# Patient Record
Sex: MALE | Race: WHITE | NOT HISPANIC OR LATINO | Employment: UNEMPLOYED | URBAN - METROPOLITAN AREA
[De-identification: names, ages, dates, MRNs, and addresses within clinical notes are randomized per-mention and may not be internally consistent; named-entity substitution may affect disease eponyms.]

---

## 2020-02-06 ENCOUNTER — HOSPITAL ENCOUNTER (EMERGENCY)
Facility: OTHER | Age: 40
Discharge: HOME | End: 2020-02-07
Attending: EMERGENCY MEDICINE

## 2020-02-06 DIAGNOSIS — J40 BRONCHITIS: Primary | ICD-10-CM

## 2020-02-06 DIAGNOSIS — R04.2 HEMOPTYSIS: ICD-10-CM

## 2020-02-06 PROCEDURE — 99285 EMERGENCY DEPT VISIT HI MDM: CPT | Mod: 25

## 2020-02-07 VITALS
BODY MASS INDEX: 32.08 KG/M2 | SYSTOLIC BLOOD PRESSURE: 116 MMHG | OXYGEN SATURATION: 98 % | RESPIRATION RATE: 16 BRPM | DIASTOLIC BLOOD PRESSURE: 77 MMHG | HEART RATE: 62 BPM | TEMPERATURE: 99 F | WEIGHT: 250 LBS | HEIGHT: 74 IN

## 2020-02-07 LAB
ALBUMIN SERPL BCP-MCNC: 4.2 G/DL (ref 3.5–5.2)
ALP SERPL-CCNC: 68 U/L (ref 55–135)
ALT SERPL W/O P-5'-P-CCNC: 47 U/L (ref 10–44)
ANION GAP SERPL CALC-SCNC: 10 MMOL/L (ref 8–16)
AST SERPL-CCNC: 33 U/L (ref 10–40)
BACTERIA #/AREA URNS HPF: NORMAL /HPF
BASOPHILS # BLD AUTO: 0.03 K/UL (ref 0–0.2)
BASOPHILS NFR BLD: 0.5 % (ref 0–1.9)
BILIRUB SERPL-MCNC: 0.6 MG/DL (ref 0.1–1)
BILIRUB UR QL STRIP: NEGATIVE
BUN SERPL-MCNC: 9 MG/DL (ref 6–20)
CALCIUM SERPL-MCNC: 9.4 MG/DL (ref 8.7–10.5)
CHLORIDE SERPL-SCNC: 104 MMOL/L (ref 95–110)
CLARITY UR: CLEAR
CO2 SERPL-SCNC: 27 MMOL/L (ref 23–29)
COLOR UR: ABNORMAL
CREAT SERPL-MCNC: 0.8 MG/DL (ref 0.5–1.4)
DIFFERENTIAL METHOD: ABNORMAL
EOSINOPHIL # BLD AUTO: 0.2 K/UL (ref 0–0.5)
EOSINOPHIL NFR BLD: 2.8 % (ref 0–8)
ERYTHROCYTE [DISTWIDTH] IN BLOOD BY AUTOMATED COUNT: 13 % (ref 11.5–14.5)
EST. GFR  (AFRICAN AMERICAN): >60 ML/MIN/1.73 M^2
EST. GFR  (NON AFRICAN AMERICAN): >60 ML/MIN/1.73 M^2
EXTRA RED TOP RAINBOW: NORMAL
GLUCOSE SERPL-MCNC: 100 MG/DL (ref 70–110)
GLUCOSE UR QL STRIP: NEGATIVE
HCT VFR BLD AUTO: 50.8 % (ref 40–54)
HGB BLD-MCNC: 16.8 G/DL (ref 14–18)
HGB UR QL STRIP: ABNORMAL
IMM GRANULOCYTES # BLD AUTO: 0.02 K/UL (ref 0–0.04)
IMM GRANULOCYTES NFR BLD AUTO: 0.3 % (ref 0–0.5)
INR PPP: 0.9 (ref 0.8–1.2)
KETONES UR QL STRIP: NEGATIVE
LEUKOCYTE ESTERASE UR QL STRIP: NEGATIVE
LYMPHOCYTES # BLD AUTO: 1.9 K/UL (ref 1–4.8)
LYMPHOCYTES NFR BLD: 29 % (ref 18–48)
MCH RBC QN AUTO: 32 PG (ref 27–31)
MCHC RBC AUTO-ENTMCNC: 33.1 G/DL (ref 32–36)
MCV RBC AUTO: 97 FL (ref 82–98)
MICROSCOPIC COMMENT: NORMAL
MONOCYTES # BLD AUTO: 1 K/UL (ref 0.3–1)
MONOCYTES NFR BLD: 14.9 % (ref 4–15)
NEUTROPHILS # BLD AUTO: 3.3 K/UL (ref 1.8–7.7)
NEUTROPHILS NFR BLD: 52.5 % (ref 38–73)
NITRITE UR QL STRIP: NEGATIVE
NRBC BLD-RTO: 0 /100 WBC
PH UR STRIP: 6 [PH] (ref 5–8)
PLATELET # BLD AUTO: 247 K/UL (ref 150–350)
PMV BLD AUTO: 8.8 FL (ref 9.2–12.9)
POTASSIUM SERPL-SCNC: 3.5 MMOL/L (ref 3.5–5.1)
PROT SERPL-MCNC: 7.7 G/DL (ref 6–8.4)
PROT UR QL STRIP: NEGATIVE
PROTHROMBIN TIME: 10.5 SEC (ref 9–12.5)
RBC # BLD AUTO: 5.25 M/UL (ref 4.6–6.2)
RBC #/AREA URNS HPF: 4 /HPF (ref 0–4)
SODIUM SERPL-SCNC: 141 MMOL/L (ref 136–145)
SP GR UR STRIP: >=1.03 (ref 1–1.03)
URN SPEC COLLECT METH UR: ABNORMAL
UROBILINOGEN UR STRIP-ACNC: NEGATIVE EU/DL
WBC # BLD AUTO: 6.37 K/UL (ref 3.9–12.7)
WBC #/AREA URNS HPF: 1 /HPF (ref 0–5)

## 2020-02-07 PROCEDURE — 81000 URINALYSIS NONAUTO W/SCOPE: CPT

## 2020-02-07 PROCEDURE — 80053 COMPREHEN METABOLIC PANEL: CPT

## 2020-02-07 PROCEDURE — 85025 COMPLETE CBC W/AUTO DIFF WBC: CPT

## 2020-02-07 PROCEDURE — 25500020 PHARM REV CODE 255: Performed by: EMERGENCY MEDICINE

## 2020-02-07 PROCEDURE — 85610 PROTHROMBIN TIME: CPT

## 2020-02-07 RX ORDER — AZITHROMYCIN 250 MG/1
250 TABLET, FILM COATED ORAL DAILY
Qty: 6 TABLET | Refills: 0 | Status: SHIPPED | OUTPATIENT
Start: 2020-02-07

## 2020-02-07 RX ORDER — BENZONATATE 100 MG/1
100 CAPSULE ORAL 3 TIMES DAILY PRN
Qty: 20 CAPSULE | Refills: 0 | Status: SHIPPED | OUTPATIENT
Start: 2020-02-07 | End: 2020-02-17

## 2020-02-07 RX ORDER — ALBUTEROL SULFATE 90 UG/1
1-2 AEROSOL, METERED RESPIRATORY (INHALATION) EVERY 4 HOURS PRN
Qty: 8 G | Refills: 1 | Status: SHIPPED | OUTPATIENT
Start: 2020-02-07 | End: 2021-02-06

## 2020-02-07 RX ADMIN — IOHEXOL 100 ML: 350 INJECTION, SOLUTION INTRAVENOUS at 01:02

## 2020-02-07 NOTE — ED TRIAGE NOTES
Pt. Presents to the ER after leaving AMA from [facility] due to the nurses having attitude there and making smart remarks. Pt. States he has been coughing up small amounts of blood intermittently for the past 3 days and he has also been bleeding rectally for the past 3 days as well. Pt. States he has been bleeding also a small amount from his rectum daily. Pt. Reports that he was diagnosed with pneumonia and admitted at [facility]. He reports some chest pain as well still that has not changed for the past couple of days. Pt. Also reports bilateral lower extremity swelling for the past 5 days. Pt. Denies any injuries or any other symptoms related to what is bringing him in. He did also mention that he has an abcess to his lower right abdomen which has been there for the past 2 years that has very slowly grown that he is concerned about. Pt. Is otherwise stable at this time. Pt. Ambulatory with strong steady gait. Pt. AAOx4. GCS 15. Breathing even and nonlabored. Pt. Is in no acute distress at this time.

## 2020-02-07 NOTE — ED PROVIDER NOTES
"Encounter Date: 2/6/2020    SCRIBE #1 NOTE: I, Abigail Hubbard, am scribing for, and in the presence of, Dr. Underwood.       History     Chief Complaint   Patient presents with    Rectal Bleeding     x3 days- bright red. Denies hx hemorrhoids. Reports AMA from [facility] yesterday for same s/s    Hemoptysis     x2 days- reports was dx with pneumonia    Leg Swelling     bilateral lower ext x5 days     Time seen by provider: 11:41 PM    This is a 39 y.o. male who presents with multiple complaints. Patient states he left AMA after being a treated as an inpatient for pneumonia at [facility]. He reports he was "mistreated and told that [facility] was more equipped to care for him." Patient states he has been coughing up blood with chest tightness for the past five days. He reports that he went to [facility] the next day and was admitted. Patient additionally reports a wound to the right, lower abdomen that has been intermittently "oozing bright red blood and pus for the past 1.5 months." He states it "started out looking like a pimple a year ago and didn't pay much attention to it." Patient additionally reports rectal bleeding and pain for the past month that worsened over the past week. He denies blood in stool. Patient states "I can feel it right now because my underwear is sticky." He states he has been "constantly weak and SOB" for the past few days. Patient reports onset of bilateral leg swelling and pain while admitted at [facility] and "dark urine" since yesterday. He states they did an ultrasound of his abdominal wound at [facility] but told him they could not do anything for him because they did not have a dermatologist on site. He states they did a rectal exam and told him he had "another hole near the hole." He notes "they did not mention anything to him about his rectal bleeding or leg swelling throughout his stay at [facility]." He denies chest pain, fever, nausea, vomiting, dysuria, frequency, or diarrhea. He denies taking any " "daily medications. He denies any medical history. He denies alcohol consumption or drug use. Patient reports tobacco use; "a few cigarettes a day." Patient states he has not been evaluated for any of these symptoms because he has been dealing with a divorce and has four children.    The history is provided by the patient.     Review of patient's allergies indicates:  No Known Allergies  History reviewed. No pertinent past medical history.  Past Surgical History:   Procedure Laterality Date    SKIN GRAFT      Done when he was 3 years old     History reviewed. No pertinent family history.  Social History     Tobacco Use    Smoking status: Current Some Day Smoker     Types: Cigarettes    Smokeless tobacco: Never Used    Tobacco comment: smokes around 2 ciggarettes every other day   Substance Use Topics    Alcohol use: Never     Frequency: Never    Drug use: Never     Review of Systems   Constitutional: Negative for chills and fever.   HENT: Negative for sore throat.    Respiratory: Positive for cough, chest tightness and shortness of breath.         Positive for hemoptysis.   Cardiovascular: Positive for leg swelling. Negative for chest pain.   Gastrointestinal: Positive for anal bleeding and rectal pain. Negative for abdominal pain, blood in stool, constipation, diarrhea, nausea and vomiting.   Genitourinary: Negative for difficulty urinating, dysuria and frequency.        Positive for "dark" urine.   Musculoskeletal: Negative for back pain.        Positive for bilateral leg pain.   Skin: Positive for wound. Negative for rash.   Neurological: Positive for weakness (generalized). Negative for dizziness, numbness and headaches.   Hematological: Does not bruise/bleed easily.       Physical Exam     Initial Vitals [02/06/20 2330]   BP Pulse Resp Temp SpO2   (!) 153/92 69 17 98.8 °F (37.1 °C) 99 %      MAP       --         Physical Exam    Nursing note and vitals reviewed.  Constitutional: He appears well-developed " and well-nourished. No distress.   HENT:   Head: Normocephalic and atraumatic.   Eyes: Conjunctivae and EOM are normal. Pupils are equal, round, and reactive to light.   Neck: Normal range of motion. Neck supple.   Cardiovascular: Normal rate, regular rhythm and normal heart sounds.   Pulmonary/Chest: Effort normal. No accessory muscle usage. No respiratory distress. He has wheezes.   End expiratory wheezes in all lung fields. Hemoptysis with nurse.   Abdominal: Soft. Normal appearance and bowel sounds are normal. There is no tenderness. There is no rebound and no guarding.   Right lower abdomen: Small healed wound with eschar and TTP. No fluctuance or induration. Ecchymotic in appearance. No discharge.   Genitourinary: Rectal exam shows guaiac negative stool. Guaiac negative stool.   Genitourinary Comments: Hemoccult negative. No hemorrhoids. Perirectal area is moist, slightly macerated, but no ulcer. No palpable masses. Left buttock: Small healed wound with eschar and TTP. No fluctuance or induration. Ecchymotic in appearance. No discharge.   Musculoskeletal: Normal range of motion. He exhibits edema.   1+ pitting edema bilaterally below the knee.   Neurological: He is alert and oriented to person, place, and time. He has normal strength. No cranial nerve deficit or sensory deficit.   Skin: Skin is warm and dry. No pallor.   Psychiatric: His behavior is normal. Thought content normal. His mood appears anxious.         ED Course   Procedures  Labs Reviewed   CBC W/ AUTO DIFFERENTIAL - Abnormal; Notable for the following components:       Result Value    Mean Corpuscular Hemoglobin 32.0 (*)     MPV 8.8 (*)     All other components within normal limits   COMPREHENSIVE METABOLIC PANEL - Abnormal; Notable for the following components:    ALT 47 (*)     All other components within normal limits   URINALYSIS, REFLEX TO URINE CULTURE - Abnormal; Notable for the following components:    Specific Gravity, UA >=1.030 (*)      Occult Blood UA 1+ (*)     All other components within normal limits    Narrative:     Preferred Collection Type->Urine, Clean Catch   PROTIME-INR   RED-TOP TUBE   URINALYSIS MICROSCOPIC    Narrative:     Preferred Collection Type->Urine, Clean Catch   BLUE-TOP TUBE          Imaging Results          CT Abdomen Pelvis With Contrast (Final result)  Result time 02/07/20 01:41:21    Final result by Whitley Higuera MD (02/07/20 01:41:21)                 Impression:      1. Moderate volume of fecal material in the colon.  Small focal region of luminal narrowing involving the proximal descending colon possibly transient/peristalsis although clinical correlation and further evaluation with endoscopy as warranted.  2. Nonspecific circumferential bladder wall thickening which can be seen with nondistention, cystitis or outlet obstruction.  Correlation with urinalysis advised.  3. Small region of skin thickening involving the right lower quadrant, which is nonspecific and could relate to possible cellulitis.  No focal soft tissue fluid collections identified on today's exam.      Electronically signed by: Whitley Higuera MD  Date:    02/07/2020  Time:    01:41             Narrative:    EXAMINATION:  CT ABDOMEN PELVIS WITH CONTRAST    CLINICAL HISTORY:  Abdominal distension;    TECHNIQUE:  Low dose axial images, sagittal and coronal reformations were obtained from the lung bases to the pubic symphysis following the IV administration of 100 mL of Omnipaque 350 .  Oral contrast was not given.    COMPARISON:  None.    FINDINGS:  The visualized lung bases demonstrate dependent bibasilar atelectasis noting evaluation is limited due to respiratory motion artifact.  The visualized portions of the heart and pericardium demonstrate no significant abnormalities.    The liver demonstrates no focal abnormality.  The portal vein and splenic vein are patent.  The gallbladder demonstrates no evidence of radiopaque stone.  No significant intra or  extrahepatic biliary ductal dilatation.  The spleen, pancreas and adrenal glands are unremarkable.    The kidneys are normal in size and location and enhance symmetrically.  There is no evidence of hydronephrosis.  There is nonspecific circumferential bladder wall thickening which can be seen with nondistention, cystitis or chronic outlet obstruction.  Correlation with urinalysis is advised.  The prostate demonstrates no significant abnormalities.    The abdominal aorta is nonaneurysmal without significant atherosclerosis.  They are normal sized periaortic lymph nodes present.  No bulky lymphadenopathy.    There is a moderate volume of fecal material in the colon.  Definitive evaluation of large and small bowel is limited due to lack of enteric contrast but there is no evidence to suggest obstruction or inflammatory change.  Incidental note is made of a small focal region of luminal narrowing involving the proximal descending.  The appendix is unremarkable.  No ascites, free intraperitoneal air or portal venous gas.    The visualized osseous structures demonstrate no acute abnormalities.  Extraperitoneal soft tissues demonstrate nonspecific small region of skin thickening involving the right lower quadrant.  No focal soft tissue fluid collections identified.                               X-Ray Chest 1 View (Final result)  Result time 02/07/20 01:12:47    Final result by Zena Najera MD (02/07/20 01:12:47)                 Impression:      Unremarkable one view of the chest.      Electronically signed by: Zena Najera  Date:    02/07/2020  Time:    01:12             Narrative:    EXAMINATION:  CHEST ONE VIEW    CLINICAL HISTORY:  Hemoptysis    TECHNIQUE:  One view of the chest.    COMPARISON:  None.    FINDINGS:  The cardiac silhouette is within normal limits.   There is no focal consolidation, pneumothorax, or pleural effusion.                                 Medical Decision Making:   Initial Assessment:    A 39-year-old male presents with multiple complaints. He apparently left [facility] after being an inpatient for being treated for pneumonia and  hemoptysis.  Complains of persistent hemoptysis that started over the last few days.  Also complains of a intermittently bleeding wound on his abdomen that is been going on for over a year as well as his buttock that is now been going on for last few days.  States he has been having increased GI bleeding that has been significant.  Based on my examination regarding his hemoptysis differential would include pneumonia, less likely tuberculosis and do not suspect pulmonary embolus.  He does have diffuse wheezing likely secondary to bronchospasm.  Offered a breathing treatments but states he did not need any at this time.    Regarding the wounds in the right lower quadrant as well as the buttock area they appear to be old drained abscesses however he states he has never had pus been is only been blood.  His Hemoccult is negative in the stool does not appear to be bloody.  I suspect the blood in his stool is due to this small now healing phlegmon.  Did a bedside ultrasound showing just small fluid collection under each that is very superficial.  He was told at [facility] that there was some type of tunneling or fistula of these wounds.  Unable to get the records at this time I am so will get a CT abdomen pelvis to evaluate for any type of deep track fistular tunneling.  No history of Crohn's disease.  Will also get blood work and chest x-ray.  Vital signs are currently stable.            Scribe Attestation:   Scribe #1: I performed the above scribed service and the documentation accurately describes the services I performed. I attest to the accuracy of the note.    Attending Attestation:           Physician Attestation for Scribe:  Physician Attestation Statement for Scribe #1: I, Dr. Underwood, reviewed documentation, as scribed by Abigail Hubbard in my  presence, and it is both accurate and complete.                 ED Course as of Feb 07 0151   Fri Feb 07, 2020   0051 CBC shows a hemoglobin of 16.8.  Urinalysis shows no protein and only 1+ blood.  INR is normal.    [SM]   0109 CMP normal      [SM]   0145 CT scan on the patient shows no specific findings.  The area in the right lower quadrant shows no fluid collection at this time.  Based on this do not feel any further workup is indicated.  I discussed all the results with the patient.  His vital signs are normal and do not feel he needs to be admitted the hospital.  Due to the suspected bronchitis which I suspect the cause for his mop to [facility] will discharge with antibiotics.  I recommend he follow up with primary care for further workup and evaluation.    Patient discharged home in stable condition. Diagnosis and treatment plan explained to patient and/or family member who is at bedside. I have answered all questions and the patient is satisfied with the plan of care. The patient demonstrates understanding of the care plan. This is the extent to the patients complaints today here in the emergency department.    []      ED Course User Index  [SM] Chance Underwood,                 Clinical Impression:     1. Bronchitis    2. Hemoptysis                                Chance Underwood,   02/07/20 0151

## 2020-02-07 NOTE — ED NOTES
Pt. Appears very anxious due to his situation at this time. Pt. Was continually wiping his bottom to show me that there is some blood when he wipes. The pt. Was making his bottom raw by wiping continually and the patient was getting visibly more anxious the more he wiped. I sat down with the pt. And talked with him to calm him down and reassure him. Pt. Then became more calm and relaxed. Pt. Is laying in bed comfortably. Vitals are stable. Pt. Is stable at this time.